# Patient Record
Sex: FEMALE | Race: BLACK OR AFRICAN AMERICAN | Employment: UNEMPLOYED | URBAN - METROPOLITAN AREA
[De-identification: names, ages, dates, MRNs, and addresses within clinical notes are randomized per-mention and may not be internally consistent; named-entity substitution may affect disease eponyms.]

---

## 2018-04-23 ENCOUNTER — HOSPITAL ENCOUNTER (EMERGENCY)
Facility: CLINIC | Age: 38
Discharge: HOME | End: 2018-04-24
Attending: EMERGENCY MEDICINE | Admitting: EMERGENCY MEDICINE
Payer: MEDICARE

## 2018-04-23 VITALS
WEIGHT: 216 LBS | OXYGEN SATURATION: 96 % | BODY MASS INDEX: 33.9 KG/M2 | DIASTOLIC BLOOD PRESSURE: 77 MMHG | RESPIRATION RATE: 30 BRPM | SYSTOLIC BLOOD PRESSURE: 130 MMHG | TEMPERATURE: 98.3 F | HEIGHT: 67 IN | HEART RATE: 78 BPM

## 2018-04-23 DIAGNOSIS — F19.10 DRUG ABUSE (H): ICD-10-CM

## 2018-04-23 DIAGNOSIS — R41.82 ALTERED MENTAL STATUS, UNSPECIFIED ALTERED MENTAL STATUS TYPE: ICD-10-CM

## 2018-04-23 PROCEDURE — 99282 EMERGENCY DEPT VISIT SF MDM: CPT

## 2018-04-23 ASSESSMENT — ENCOUNTER SYMPTOMS
NECK PAIN: 0
NAUSEA: 1
VOMITING: 0
HEADACHES: 0

## 2018-04-23 NOTE — ED AVS SNAPSHOT
Emergency Department    [address] [place] [address]    Phone:  [phone]    Fax:  [phone]                                       Sonia Yost   MRN: [id number]    Department:   Emergency Department   Date of Visit:  4/23/2018           After Visit Summary Signature Page     I have received my discharge instructions, and my questions have been answered. I have discussed any challenges I see with this plan with the nurse or doctor.    ..........................................................................................................................................  Patient/Patient Representative Signature      ..........................................................................................................................................  Patient Representative Print Name and Relationship to Patient    ..................................................               ................................................  Date                                            Time    ..........................................................................................................................................  Reviewed by Signature/Title    ...................................................              ..............................................  Date                                                            Time

## 2018-04-23 NOTE — ED AVS SNAPSHOT
Emergency Department    [address] [facility] [address]    Phone:  [phone]    Fax:  [phone]                                       Sonia Yost   MRN: [id number]    Department:   Emergency Department   Date of Visit:  4/23/2018           Patient Information     Date Of Birth          1980        Your diagnoses for this visit were:     Altered mental status, unspecified altered mental status type     Drug abuse        You were seen by Sam Nina MD.      Follow-up Information     Schedule an appointment as soon as possible for a visit with Clinic, [facility].    Contact information:    [address] [facility] [address] [phone]          Follow up with  Emergency Department.    Specialty:  EMERGENCY MEDICINE    Why:  If symptoms worsen    Contact information:    [address] [facility] [address] [phone]      Discharge References/Attachments     DRUG ABUSE (ENGLISH)    ALTERED LEVEL OF CONSCIOUSNESS (LOC) (ENGLISH)      24 Hour Appointment Hotline       To make an appointment at any [facility], call [phone] ([phone]). If you don't have a family doctor or clinic, we will help you find one. [facility] clinics are conveniently located to serve the needs of you and your family.             Review of your medicines      Notice     You have not been prescribed any medications.            Orders Needing Specimen Collection     None      Pending Results     No orders found for last 3 day(s).            Pending Culture Results     No orders found for last 3 day(s).            Pending Results Instructions     If you had any lab results that were not finalized at the time of your Discharge, you can call the ED Lab Result RN at [phone]. You will be contacted by this team for any positive Lab results or changes in treatment. The nurses are available 7 days a week from 10A to 6:30P.  You can leave a  message 24 hours per day and they will return your call.        Test Results From Your Hospital Stay               Clinical Quality Measure: Blood Pressure Screening     Your blood pressure was checked while you were in the emergency department today. The last reading we obtained was  BP: 130/77 . Please read the guidelines below about what these numbers mean and what you should do about them.  If your systolic blood pressure (the top number) is less than 120 and your diastolic blood pressure (the bottom number) is less than 80, then your blood pressure is normal. There is nothing more that you need to do about it.  If your systolic blood pressure (the top number) is 120-139 or your diastolic blood pressure (the bottom number) is 80-89, your blood pressure may be higher than it should be. You should have your blood pressure rechecked within a year by a primary care provider.  If your systolic blood pressure (the top number) is 140 or greater or your diastolic blood pressure (the bottom number) is 90 or greater, you may have high blood pressure. High blood pressure is treatable, but if left untreated over time it can put you at risk for heart attack, stroke, or kidney failure. You should have your blood pressure rechecked by a primary care provider within the next 4 weeks.  If your provider in the emergency department today gave you specific instructions to follow-up with your doctor or provider even sooner than that, you should follow that instruction and not wait for up to 4 weeks for your follow-up visit.        Thank you for choosing [facility]       Thank you for choosing [facility] for your care. Our goal is always to provide you with excellent care. Hearing back from our patients is one way we can continue to improve our services. Please take a few minutes to complete the written survey that you may receive in the mail after you visit with us. Thank you!        [facility] Information     [facility] lets you send  "messages to your doctor, view your test results, renew your prescriptions, schedule appointments and more. To sign up, go to [email or web address] . Click on \"Log in\" on the left side of the screen, which will take you to the Welcome page. Then click on \"Sign up Now\" on the right side of the page.     You will be asked to enter the access code listed below, as well as some personal information. Please follow the directions to create your username and password.     Your access code is: [email or web address]  Expires: 2018  1:29 AM     Your access code will  in 90 days. If you need help or a new code, please call your [facility] clinic or [phone].        Care EveryWhere ID     This is your Care EveryWhere ID. This could be used by other organizations to access your [facility] medical records  [id number]        Equal Access to Services     HERMINIO RAY : Hadii theo gallegoso Kory, waaxda luchelo, qaybta kaalmada aderocco, malorie naranjo . So [facility] [phone].    ATENCIÓN: Si habla español, tiene a joel disposición servicios gratuitos de asistencia lingüística. Llame al [phone].    We comply with applicable federal civil rights laws and [place] laws. We do not discriminate on the basis of race, color, national origin, age, disability, sex, sexual orientation, or gender identity.            After Visit Summary       This is your record. Keep this with you and show to your community pharmacist(s) and doctor(s) at your next visit.                  "

## 2018-04-24 NOTE — ED NOTES
Pt is asking for a cab back to her group home. ED RN will go and talk to charge RN and pt's ED MD.

## 2018-04-24 NOTE — ED PROVIDER NOTES
"  History     Chief Complaint:  Addiction Problem      HPI   Sonia Yost is a 38 year old female who presents with intoxication outside of [employer]. She was found face down in the bushes after smoking K2 at 2040. She reports she thought this was marijuana, and when she smokes K2 she gets sick every time. Patient states she does not smoke marijuana that often. She currently endorses some nausea. Patient lives with roommates. Patient denies any pain, headache, vomiting, abdominal pain, and neck pain. She denies alcohol or other drug use.     Allergies:  No known drug allergies.    Medications:    The patient is not currently taking any prescribed medications.    Past Medical History:    History reviewed. No pertinent past medical history.    Past Surgical History:    History reviewed. No pertinent surgical history.    Family History:    History reviewed. No pertinent family history.    Social History:  Presents to the ED via EMS.   Tobacco Use: Current every day smoker.   Alcohol Use: Yes     Review of Systems   Gastrointestinal: Positive for nausea. Negative for vomiting.   Musculoskeletal: Negative for neck pain.   Neurological: Negative for headaches.   All other systems reviewed and are negative.      Physical Exam   First Vitals:  BP: 130/77  Pulse: 78  Heart Rate: 79  Temp: 98.3  F (36.8  C)  Resp: 30  Height: 170.2 cm (5' 7\")  Weight: 98 kg (216 lb)  SpO2: 96 %      Physical Exam  Nursing note and vitals reviewed.  Constitutional:  Oriented to person, place, and time. Cooperative. Smells of marijuana.   HENT:   Nose:    Nose normal.   Mouth/Throat:   Mucous membranes are normal.   Eyes:    Conjunctivae normal and EOM are normal.      Pupils are equal, round, and reactive to light.   Neck:    Trachea normal.   Cardiovascular:  Normal rate, regular rhythm, normal heart sounds and normal pulses. No murmur heard.  Pulmonary/Chest:  Effort normal and breath sounds normal.   Abdominal:   Soft. Normal " appearance and bowel sounds are normal.      There is no tenderness.      There is no rebound and no CVA tenderness.   Musculoskeletal:  Extremities atraumatic x 4.   Lymphadenopathy:  No cervical adenopathy.   Neurological:   Alert and oriented to person, place, and time. Normal strength.      No cranial nerve deficit or sensory deficit. GCS eye subscore is 4. GCS verbal subscore is 5. GCS motor subscore is 6.   Skin:    Skin is intact. No rash noted.   Psychiatric:   Normal mood and affect.    Emergency Department Course     Emergency Department Course:  The patient arrived in triage where vitals were measured and recorded.   The patient was then escorted back to the emergency department.   The patient's medical records were reviewed.  Nursing notes and vitals were reviewed.  2231: I performed an exam of the patient as documented above.  The above workup was undertaken.  0045: I rechecked the patient and discussed results.  Findings and plan explained to the Patient. Patient discharged home, status improved, with instructions regarding supportive care, medications, and reasons to return as well as the importance of close follow-up was reviewed.     Impression & Plan      Medical Decision Making:  This is a 38-year-old female brought in by EMS after she was found outside in the bushes.  She indicates she fell, but she also indicates that she smoked marijuana that she believes was actually K2. She indicates that she always gets like this when she smokes K2, but she otherwise has no complaints.  We watched her here for quite some time, and she improved appropriately.  Therefore at this point she will be discharged.  She should follow-up with her own physician as needed and certainly return with any concerns or worsening symptoms.    Diagnosis:    ICD-10-CM    1. Altered mental status, unspecified altered mental status type R41.82    2. Drug abuse F19.10        Disposition:  Discharged to home.       Jazmyn RICE  am serving as a scribe on 4/23/2018 at 10:31 PM to personally document services performed by Dr. Nina based on my observations and the provider's statements to me.    EMERGENCY DEPARTMENT       Sam Nina MD  04/24/18 0105

## 2018-04-24 NOTE — ED NOTES
Pt is requesting to use the restroom @ this time. RN is @ her side. Pt was walked to restroom via ED staff. Pt is steady on her feet and has no complaints @ this time.

## 2018-04-24 NOTE — ED TRIAGE NOTES
Pt arrives with a C-collar in place that she has removed.  Bystanders found her face down in a bush at the [facility].  Pt said she found some pot to smoke but when she smoked it realized it was K2.  She told EMS and nursing that she  tonight.  Denies injury and no injuries noted but she is covered with leaves. She is unsteady on her feet and needs assistance with standing and changing into scrubs.  She falls asleep quickly and won't open her eyes to answer questions.  She is a little agitated when having to answer triage question.  Pt RR is elevated at 30 per minute with sats 96% on RA. States she lives at a Group home. Hx high blood pressure but unable to name meds.

## 2018-04-24 NOTE — ED NOTES
Bed: ED17  Expected date: 4/23/18  Expected time: 9:30 PM  Means of arrival: Ambulance  Comments:  Yamilex [age]F alcohol/drug abuse

## 2019-03-09 ENCOUNTER — HOSPITAL ENCOUNTER (EMERGENCY)
Facility: CLINIC | Age: 39
Discharge: HOME | End: 2019-03-09
Attending: EMERGENCY MEDICINE | Admitting: EMERGENCY MEDICINE
Payer: MEDICARE

## 2019-03-09 VITALS
TEMPERATURE: 97.8 F | RESPIRATION RATE: 16 BRPM | SYSTOLIC BLOOD PRESSURE: 141 MMHG | HEIGHT: 67 IN | OXYGEN SATURATION: 98 % | BODY MASS INDEX: 33.83 KG/M2 | DIASTOLIC BLOOD PRESSURE: 94 MMHG

## 2019-03-09 DIAGNOSIS — Z79.899 ENCOUNTER FOR MEDICATION REVIEW: ICD-10-CM

## 2019-03-09 PROCEDURE — 99282 EMERGENCY DEPT VISIT SF MDM: CPT

## 2019-03-09 ASSESSMENT — ENCOUNTER SYMPTOMS: HALLUCINATIONS: 0

## 2019-03-09 NOTE — ED NOTES
Bed: [facility]  Expected date:   Expected time:   Means of arrival:   Comments:  477 39F IAM hernandes

## 2019-03-09 NOTE — ED AVS SNAPSHOT
Emergency Department  [address] [place] [address]  Phone:  [phone]  Fax:  [phone]                                    Sonia Ascencio   MRN: [id number]    Department:   Emergency Department   Date of Visit:  3/9/2019           After Visit Summary Signature Page    I have received my discharge instructions, and my questions have been answered. I have discussed any challenges I see with this plan with the nurse or doctor.    ..........................................................................................................................................  Patient/Patient Representative Signature      ..........................................................................................................................................  Patient Representative Print Name and Relationship to Patient    ..................................................               ................................................  Date                                   Time    ..........................................................................................................................................  Reviewed by Signature/Title    ...................................................              ..............................................  Date                                               Time          22EPIC Rev 08/18

## 2019-03-10 NOTE — ED PROVIDER NOTES
"  History     Chief Complaint:  Psychiatric Evaluation     HPI   Sonia Yost is a 39 year old female with a past medical history of type two diabetes and paranoid schizophrenia who presents to the emergency department today for a psychiatric evaluation. The patient called EMS from her group home because she wants a medication review. She has been off her medications for around five days because she feels as if her medications are making her feel dizzy and tired. Furthermore, she feels as if she has been misdiagnosed with delusions and other diagnoses that have resulted in her being on all of her medications. She denies hallucinations, delusions, or violent thoughts or any other symptoms here in the emergency department.     Allergies:  No Known Drug Allergies    Medications:    Vitamin D3  Aspirin   Lipitor   Metformin  Prinzide  Norvasc  Zyprexa  Prolixin    Past Medical History:    Morbid obesity   Type 2 diabetes  Paranoid schizophrenia   Hypertension     Past Surgical History:    History reviewed. No pertinent past surgical history.    Family History:    History reviewed. No pertinent family history.     Social History:  The patient was alone in the emergency department.   Smoking Status: current every day smoker   Smokeless Tobacco: Never used   Alcohol Use: Yes   Drug use: Yes marijuana  Marital Status:  Single      Review of Systems   Psychiatric/Behavioral: Negative for behavioral problems and hallucinations.   All other systems reviewed and are negative.    Physical Exam   First Vitals:  Patient Vitals for the past 24 hrs:   BP Temp Temp src Resp SpO2 Height   03/09/19 1902 (!) 141/94 97.8  F (36.6  C) Oral 16 98 % 1.702 m (5' 7\")     Physical Exam  General: Alert and Interactive.   Head: No signs of trauma.   Mouth/Throat: Oropharynx is clear and moist.   Eyes: Conjunctivae are normal. Pupils are equal, round, and reactive to light.   Neck: Normal range of motion. No nuchal rigidity.   CV: Normal rate " and regular rhythm.    Resp: Effort normal and breath sounds normal. No respiratory distress.   GI: Soft. There is no tenderness or guarding.   MSK: Normal range of motion. no edema.   Neuro: The patient is alert and oriented to person, place, and time.  PERRLA, EOMI, strength in upper/lower extremities normal and symmetrical.   Sensation normal. Speech normal.  GCS eye subscore is 4. GCS verbal subscore is 5. GCS motor subscore is 6.   Skin: Skin is warm and dry. No rash noted.   Psych: normal mood and affect. behavior is normal.     Emergency Department Course   Emergency Department Course:  Nursing notes and vitals reviewed.  1815: I performed an exam of the patient as documented above.     Findings and plan explained to the Patient. Patient discharged home with instructions regarding supportive care, medications, and reasons to return. The importance of close follow-up was reviewed.     I personally answered all related questions prior to discharge.    Impression & Plan    Sonia Ascencio is a 39 year old female who presents to the emergency department today with concerns of her stopping her medications. She lives in a group home and has been prescribed multiple medications but according to information provided to me, she is able to make her own medical decisions and does not have a person making those decisions for her. Because of this and her discussions of wanting to stop her medications, I encouraged her to watch her blood pressure closely if she is going to stop taking her blood pressure medications and also to report any abnormal thoughts, visions, or hearing voices if she is stopping her Seroquel or Prolixin. Patient is very pleasant. Her physical exam is normal. Vital signs are WNL here. We will have her follow up with her PCP in the next week one to two days, but will be discharged back into her group home at [facility].     Diagnosis:    ICD-10-CM    1. Encounter for medication  review Z79.899        Disposition:  discharged to home    Mikal Serra  3/9/2019    EMERGENCY DEPARTMENT  Scribe Disclosure:  I, Mikal Ponce, am serving as a scribe at 6:12 PM on 3/9/2019 to document services personally performed by Han Hawk MD based on my observations and the provider's statements to me.        Han Hawk MD  03/10/19 0328

## 2019-03-10 NOTE — ED NOTES
[facility] returned call. Patient will need to come back by taxi due to weather conditions.

## 2019-03-10 NOTE — ED NOTES
Call placed to [facility]. Spoke with worker who is checking on a ride home for patient. He will return call. [facility] address is [address] [facility]. [phone]